# Patient Record
Sex: FEMALE | Race: WHITE
[De-identification: names, ages, dates, MRNs, and addresses within clinical notes are randomized per-mention and may not be internally consistent; named-entity substitution may affect disease eponyms.]

---

## 2020-11-11 ENCOUNTER — HOSPITAL ENCOUNTER (OUTPATIENT)
Dept: HOSPITAL 76 - LAB.WCP | Age: 35
Discharge: HOME | End: 2020-11-11
Attending: NURSE PRACTITIONER
Payer: COMMERCIAL

## 2020-11-11 DIAGNOSIS — Z00.00: Primary | ICD-10-CM

## 2020-11-11 LAB
ALBUMIN DIAFP-MCNC: 4.1 G/DL (ref 3.2–5.5)
ALBUMIN/GLOB SERPL: 1.2 {RATIO} (ref 1–2.2)
ALP SERPL-CCNC: 42 IU/L (ref 42–121)
ALT SERPL W P-5'-P-CCNC: 15 IU/L (ref 10–60)
ANION GAP SERPL CALCULATED.4IONS-SCNC: 8 MMOL/L (ref 6–13)
AST SERPL W P-5'-P-CCNC: 17 IU/L (ref 10–42)
BASOPHILS NFR BLD AUTO: 0 10^3/UL (ref 0–0.1)
BASOPHILS NFR BLD AUTO: 0.5 %
BILIRUB BLD-MCNC: 0.6 MG/DL (ref 0.2–1)
BUN SERPL-MCNC: 19 MG/DL (ref 6–20)
CALCIUM UR-MCNC: 9.4 MG/DL (ref 8.5–10.3)
CHLORIDE SERPL-SCNC: 104 MMOL/L (ref 101–111)
CHOLEST SERPL-MCNC: 190 MG/DL
CO2 SERPL-SCNC: 27 MMOL/L (ref 21–32)
CREAT SERPLBLD-SCNC: 0.6 MG/DL (ref 0.4–1)
EOSINOPHIL # BLD AUTO: 0.1 10^3/UL (ref 0–0.7)
EOSINOPHIL NFR BLD AUTO: 1.4 %
ERYTHROCYTE [DISTWIDTH] IN BLOOD BY AUTOMATED COUNT: 13.2 % (ref 12–15)
GLOBULIN SER-MCNC: 3.4 G/DL (ref 2.1–4.2)
GLUCOSE SERPL-MCNC: 88 MG/DL (ref 70–100)
HDLC SERPL-MCNC: 53 MG/DL
HDLC SERPL: 3.6 {RATIO} (ref ?–4.4)
HGB UR QL STRIP: 13.9 G/DL (ref 12–16)
LDLC SERPL CALC-MCNC: 119 MG/DL
LDLC/HDLC SERPL: 2.2 {RATIO} (ref ?–4.4)
LYMPHOCYTES # SPEC AUTO: 2 10^3/UL (ref 1.5–3.5)
LYMPHOCYTES NFR BLD AUTO: 34.8 %
MCH RBC QN AUTO: 26.6 PG (ref 27–31)
MCHC RBC AUTO-ENTMCNC: 30.9 G/DL (ref 32–36)
MCV RBC AUTO: 86 FL (ref 81–99)
MONOCYTES # BLD AUTO: 0.5 10^3/UL (ref 0–1)
MONOCYTES NFR BLD AUTO: 8.7 %
NEUTROPHILS # BLD AUTO: 3.1 10^3/UL (ref 1.5–6.6)
NEUTROPHILS # SNV AUTO: 5.8 X10^3/UL (ref 4.8–10.8)
NEUTROPHILS NFR BLD AUTO: 54.3 %
PDW BLD AUTO: 11.2 FL (ref 7.9–10.8)
PLATELET # BLD: 245 10^3/UL (ref 130–450)
PROT SPEC-MCNC: 7.5 G/DL (ref 6.7–8.2)
RBC MAR: 5.23 10^6/UL (ref 4.2–5.4)
SODIUM SERPLBLD-SCNC: 139 MMOL/L (ref 135–145)
VLDLC SERPL-SCNC: 18 MG/DL

## 2020-11-11 PROCEDURE — 80061 LIPID PANEL: CPT

## 2020-11-11 PROCEDURE — 80053 COMPREHEN METABOLIC PANEL: CPT

## 2020-11-11 PROCEDURE — 84443 ASSAY THYROID STIM HORMONE: CPT

## 2020-11-11 PROCEDURE — 83721 ASSAY OF BLOOD LIPOPROTEIN: CPT

## 2020-11-11 PROCEDURE — 36415 COLL VENOUS BLD VENIPUNCTURE: CPT

## 2020-11-11 PROCEDURE — 85025 COMPLETE CBC W/AUTO DIFF WBC: CPT

## 2021-02-09 ENCOUNTER — HOSPITAL ENCOUNTER (EMERGENCY)
Dept: HOSPITAL 76 - ED | Age: 36
Discharge: HOME | End: 2021-02-09
Payer: COMMERCIAL

## 2021-02-09 ENCOUNTER — HOSPITAL ENCOUNTER (OUTPATIENT)
Dept: HOSPITAL 76 - EMS | Age: 36
End: 2021-02-09
Payer: COMMERCIAL

## 2021-02-09 VITALS — SYSTOLIC BLOOD PRESSURE: 132 MMHG | DIASTOLIC BLOOD PRESSURE: 80 MMHG

## 2021-02-09 VITALS — SYSTOLIC BLOOD PRESSURE: 134 MMHG | DIASTOLIC BLOOD PRESSURE: 73 MMHG

## 2021-02-09 DIAGNOSIS — Z98.890: ICD-10-CM

## 2021-02-09 DIAGNOSIS — R50.9: Primary | ICD-10-CM

## 2021-02-09 DIAGNOSIS — R53.1: ICD-10-CM

## 2021-02-09 DIAGNOSIS — Z20.822: ICD-10-CM

## 2021-02-09 DIAGNOSIS — K59.00: Primary | ICD-10-CM

## 2021-02-09 DIAGNOSIS — R10.30: ICD-10-CM

## 2021-02-09 LAB
ALBUMIN DIAFP-MCNC: 3.2 G/DL (ref 3.2–5.5)
ALBUMIN/GLOB SERPL: 1 {RATIO} (ref 1–2.2)
ALP SERPL-CCNC: 50 IU/L (ref 42–121)
ALT SERPL W P-5'-P-CCNC: 48 IU/L (ref 10–60)
ANION GAP SERPL CALCULATED.4IONS-SCNC: 10 MMOL/L (ref 6–13)
ANION GAP SERPL CALCULATED.4IONS-SCNC: 13 MMOL/L (ref 6–13)
AST SERPL W P-5'-P-CCNC: 31 IU/L (ref 10–42)
B-HCG SERPL QL: NEGATIVE
BASOPHILS NFR BLD AUTO: 0 10^3/UL (ref 0–0.1)
BASOPHILS NFR BLD AUTO: 0 10^3/UL (ref 0–0.1)
BASOPHILS NFR BLD AUTO: 0.1 %
BASOPHILS NFR BLD AUTO: 0.3 %
BILIRUB BLD-MCNC: 0.5 MG/DL (ref 0.2–1)
BUN SERPL-MCNC: 11 MG/DL (ref 6–20)
BUN SERPL-MCNC: 13 MG/DL (ref 6–20)
C PNEUM DNA NPH QL NAA+NON-PROBE: NOT DETECTED
CALCIUM UR-MCNC: 8.3 MG/DL (ref 8.5–10.3)
CALCIUM UR-MCNC: 8.6 MG/DL (ref 8.5–10.3)
CHLORIDE SERPL-SCNC: 100 MMOL/L (ref 101–111)
CHLORIDE SERPL-SCNC: 105 MMOL/L (ref 101–111)
CLARITY UR REFRACT.AUTO: CLEAR
CO2 SERPL-SCNC: 22 MMOL/L (ref 21–32)
CO2 SERPL-SCNC: 23 MMOL/L (ref 21–32)
CREAT SERPLBLD-SCNC: 0.6 MG/DL (ref 0.4–1)
CREAT SERPLBLD-SCNC: 0.6 MG/DL (ref 0.4–1)
EOSINOPHIL # BLD AUTO: 0 10^3/UL (ref 0–0.7)
EOSINOPHIL # BLD AUTO: 0.1 10^3/UL (ref 0–0.7)
EOSINOPHIL NFR BLD AUTO: 0 %
EOSINOPHIL NFR BLD AUTO: 1.3 %
ERYTHROCYTE [DISTWIDTH] IN BLOOD BY AUTOMATED COUNT: 12.9 % (ref 12–15)
ERYTHROCYTE [DISTWIDTH] IN BLOOD BY AUTOMATED COUNT: 13.3 % (ref 12–15)
GLOBULIN SER-MCNC: 3.2 G/DL (ref 2.1–4.2)
GLUCOSE SERPL-MCNC: 111 MG/DL (ref 70–100)
GLUCOSE SERPL-MCNC: 125 MG/DL (ref 70–100)
GLUCOSE UR QL STRIP.AUTO: NEGATIVE MG/DL
HCG UR QL: NEGATIVE
HGB UR QL STRIP: 11.4 G/DL (ref 12–16)
HGB UR QL STRIP: 12 G/DL (ref 12–16)
KETONES UR QL STRIP.AUTO: 15 MG/DL
LYMPHOCYTES # SPEC AUTO: 1.1 10^3/UL (ref 1.5–3.5)
LYMPHOCYTES # SPEC AUTO: 1.1 10^3/UL (ref 1.5–3.5)
LYMPHOCYTES NFR BLD AUTO: 14.1 %
LYMPHOCYTES NFR BLD AUTO: 15.3 %
MCH RBC QN AUTO: 26.5 PG (ref 27–31)
MCH RBC QN AUTO: 26.6 PG (ref 27–31)
MCHC RBC AUTO-ENTMCNC: 30.8 G/DL (ref 32–36)
MCHC RBC AUTO-ENTMCNC: 31.5 G/DL (ref 32–36)
MCV RBC AUTO: 84.6 FL (ref 81–99)
MCV RBC AUTO: 86.1 FL (ref 81–99)
MONOCYTES # BLD AUTO: 0.3 10^3/UL (ref 0–1)
MONOCYTES # BLD AUTO: 0.4 10^3/UL (ref 0–1)
MONOCYTES NFR BLD AUTO: 3.6 %
MONOCYTES NFR BLD AUTO: 5.1 %
NEUTROPHILS # BLD AUTO: 5.9 10^3/UL (ref 1.5–6.6)
NEUTROPHILS # BLD AUTO: 6.2 10^3/UL (ref 1.5–6.6)
NEUTROPHILS # SNV AUTO: 7.3 X10^3/UL (ref 4.8–10.8)
NEUTROPHILS # SNV AUTO: 7.8 X10^3/UL (ref 4.8–10.8)
NEUTROPHILS NFR BLD AUTO: 79.1 %
NEUTROPHILS NFR BLD AUTO: 80.9 %
NITRITE UR QL STRIP.AUTO: NEGATIVE
PDW BLD AUTO: 10.3 FL (ref 7.9–10.8)
PDW BLD AUTO: 10.6 FL (ref 7.9–10.8)
PH UR STRIP.AUTO: 7.5 PH (ref 5–7.5)
PLATELET # BLD: 255 10^3/UL (ref 130–450)
PLATELET # BLD: 260 10^3/UL (ref 130–450)
PROT SPEC-MCNC: 6.4 G/DL (ref 6.7–8.2)
PROT UR STRIP.AUTO-MCNC: NEGATIVE MG/DL
RBC # UR STRIP.AUTO: NEGATIVE /UL
RBC MAR: 4.28 10^6/UL (ref 4.2–5.4)
RBC MAR: 4.52 10^6/UL (ref 4.2–5.4)
SP GR UR STRIP.AUTO: 1.01 (ref 1–1.03)
UROBILINOGEN UR QL STRIP.AUTO: (no result) E.U./DL
UROBILINOGEN UR STRIP.AUTO-MCNC: NEGATIVE MG/DL

## 2021-02-09 PROCEDURE — 87070 CULTURE OTHR SPECIMN AEROBIC: CPT

## 2021-02-09 PROCEDURE — 36415 COLL VENOUS BLD VENIPUNCTURE: CPT

## 2021-02-09 PROCEDURE — 84703 CHORIONIC GONADOTROPIN ASSAY: CPT

## 2021-02-09 PROCEDURE — 96375 TX/PRO/DX INJ NEW DRUG ADDON: CPT

## 2021-02-09 PROCEDURE — 96376 TX/PRO/DX INJ SAME DRUG ADON: CPT

## 2021-02-09 PROCEDURE — 87086 URINE CULTURE/COLONY COUNT: CPT

## 2021-02-09 PROCEDURE — 74177 CT ABD & PELVIS W/CONTRAST: CPT

## 2021-02-09 PROCEDURE — 81003 URINALYSIS AUTO W/O SCOPE: CPT

## 2021-02-09 PROCEDURE — 85025 COMPLETE CBC W/AUTO DIFF WBC: CPT

## 2021-02-09 PROCEDURE — 96374 THER/PROPH/DIAG INJ IV PUSH: CPT

## 2021-02-09 PROCEDURE — 99285 EMERGENCY DEPT VISIT HI MDM: CPT

## 2021-02-09 PROCEDURE — 80048 BASIC METABOLIC PNL TOTAL CA: CPT

## 2021-02-09 PROCEDURE — 81001 URINALYSIS AUTO W/SCOPE: CPT

## 2021-02-09 PROCEDURE — 87430 STREP A AG IA: CPT

## 2021-02-09 PROCEDURE — 99284 EMERGENCY DEPT VISIT MOD MDM: CPT

## 2021-02-09 PROCEDURE — 80053 COMPREHEN METABOLIC PANEL: CPT

## 2021-02-09 PROCEDURE — 71045 X-RAY EXAM CHEST 1 VIEW: CPT

## 2021-02-09 PROCEDURE — 81025 URINE PREGNANCY TEST: CPT

## 2021-02-09 PROCEDURE — 0202U NFCT DS 22 TRGT SARS-COV-2: CPT

## 2021-02-09 NOTE — ED PHYSICIAN DOCUMENTATION
ED Addendum





- Addendum


Addendum: 





02/09/21 09:32


35-year-old female s/p manipulation of her ankle surgically has developed 

narcotic induced constipation we required 2 enemas in the emergency department 

to relieve her she feels much improved after several bowel movements and wants 

to go home.  She is instructed to take MiraLAX on a regular basis for 1 to 2 

weeks to retrain her colon.

## 2021-02-09 NOTE — CT REPORT
PROCEDURE:  Abdomen/Pelvis W

 

INDICATIONS:  Mid and lower abdominal cramping pain. History of recent foot and ankle surgery.

 

CONTRAST:  IV CONTRAST: Optiray 320 ml: 100 PO CONTRAST: *NO PO CONTRAST 

 

TECHNIQUE:  

After the administration of intravenous contrast, 5 mm thick sections acquired from the diaphragms to
 the symphysis.  5 mm thick coronal and sagittal reformats were acquired.  For radiation dose reducti
on, the following was used:  automated exposure control, adjustment of mA and/or kV according to dominic
ent size.  

 

COMPARISON:  None.

 

FINDINGS:  

Image quality:  Excellent.  

 

ABDOMEN:  

Lung bases: There is mild dependent atelectasis in the lung bases. Heart size is normal. There are bi
lateral breast implants partially visualized. 

 

Solid organs:  Evaluation of the liver demonstrates no focal hepatic lesions. Gallbladder appears wit
hin normal limits without calcified gallstones. Biliary system is non dilated.  The spleen is normal 
in size. Pancreas enhances normally without peripancreatic fat stranding or fluid collections.  No ad
renal nodules.  Kidneys demonstrate no hydronephrosis. 

 

Peritoneum and bowel:  There is moderate stool distention within the distal descending and sigmoid co
don compatible with constipation or obstipation. More proximally, there is moderate fluid distention 
throughout the colon with air-fluid levels within the descending, transverse, and ascending colon. Th
ere is also mild fluid distention within the distal small bowel with scattered air-fluid levels. The 
findings are suggestive of an ileus. No definite focal transition point to suggest a bowel obstructio
n. No free fluid or air.  

 

Nodes and vessels:  No retroperitoneal or mesenteric adenopathy by size criteria.  Aorta and inferior
 vena cava are normal in size.  

 

Miscellaneous:  No ventral hernias.  

 

 

PELVIS:  

Genitourinary:  Bladder wall thickness is normal.  

 

Miscellaneous:  No inguinal hernias or adenopathy.  

 

Bones:  No suspicious bony lesions.  No vertebral body compression fractures.  

 

IMPRESSION:  

 

1. Moderate stool distention of the sigmoid colon with fluid distention demonstrated more proximally 
throughout the colon as well as in the distal small bowel. Findings likely represent an ileus without
 definite evidence of focal bowel obstruction.

 

Reviewed by: Jef Walden MD on 2/9/2021 8:42 AM PST

Approved by: Jef Walden MD on 2/9/2021 8:42 AM PST

 

 

Station ID:  [id number]

## 2021-02-09 NOTE — ED PHYSICIAN DOCUMENTATION
PD HPI ABD PAIN





- Stated complaint


Stated Complaint: ADB PX





- History obtained from


History obtained from: Patient





- History of Present Illness


Timing - onset: How many hours ago (several hours of marked cramping abd pains 

mid/lower abd.)


Timing - details: Gradual onset (He states she had left foot and ankle 

reconstructive surgery 4 days ago.  She had not had a bowel movement for a day 

or 2 prior to that.  She had had just small firm stools in the last day.  She is

having increased cramping and bloating after taking mag citrate. Had had pain 

meds post op.)


Quality: Cramping, Aching, Fullness/distended


Location: All over / everywhere, Periumbilical


Improved by: No: Position


Worsened by: Moving.  No: Breathing


Associated symptoms: Nausea, Constipation.  No: Fever, Vomiting, Diarrhea, 

Melena


Similar symptoms before: Has not had sx before (history of constipation 

problems, not had cramps like this in the past. No prior abd surgery.)


Recently seen: Surgery (4 days ago for left foot and ankle.)





Review of Systems


Constitutional: denies: Fever, Chills


Nose: denies: Rhinorrhea / runny nose, Congestion


Throat: denies: Sore throat


Cardiac: denies: Chest pain / pressure


Respiratory: denies: Dyspnea, Cough


GI: reports: Abdominal Pain (cramping), Nausea, Constipation.  denies: Vomiting,

Diarrhea, Bloody / black stool


: denies: Dysuria, Unable to Void


Skin: denies: Rash


Neurologic: denies: Generalized weakness, Near syncope





PD PAST MEDICAL HISTORY





- Past Medical History


Cardiovascular: None


Respiratory: None


GI: None, Chronic constipation





- Present Medications


Home Medications: 


                                Ambulatory Orders











 Medication  Instructions  Recorded  Confirmed


 


Citalopram Hydrobromide 40 mg PO 02/09/21 





[Citalopram HBr]   


 


Tofacitinib Citrate [Xeljanz Xr] 11 mg PO 02/09/21 02/09/21


 


Topiramate [Topamax] 25 mg PO 02/09/21 


 


Zolmitriptan [Zomig]  02/09/21 














- Allergies


Allergies/Adverse Reactions: 


                                    Allergies











Allergy/AdvReac Type Severity Reaction Status Date / Time


 


No Known Drug Allergies Allergy   Verified 02/09/21 05:29














PD ED PE NORMAL





- Vitals


Vital signs reviewed: Yes





- General


General: Alert and oriented X 3, Well developed/nourished, Other (appears in 

considerable pain and rubbing her mid to lower abd repetitively. )





- Cardiac


Cardiac: RRR, No murmur





- Respiratory


Respiratory: Clear bilaterally





- Abdomen


Abdomen: Soft, Non distended, Other (increased bowel sounds mid abd. Tender mid 

abd without guarding nor percussion tender. )





- Female 


Female : Deferred





- Rectal


Rectal: Deferred





- Derm


Derm: Normal color, Warm and dry





- Extremities


Extremities: Other (dressing and splint on left ankle/foot. No calf tenderness 

nor swelling above the splint. )





- Neuro


Neuro: Alert and oriented X 3, No motor deficit, No sensory deficit, Normal 

speech





Results





- Vitals


Vitals: 


                               Vital Signs - 24 hr











  02/09/21 02/09/21 02/09/21





  03:52 04:17 06:00


 


Temperature 37.3 C  36.5 C


 


Heart Rate 108 H 94 85


 


Respiratory 22 27 H 21





Rate   


 


Blood Pressure 139/88 H 147/88 H 136/89 H


 


O2 Saturation 99 100 98








                                     Oxygen











O2 Source                      Room air

















- Labs


Labs: 


                                Laboratory Tests











  02/09/21 02/09/21 02/09/21





  06:07 06:07 06:07


 


WBC  7.3  


 


RBC  4.52  


 


Hgb  12.0  


 


Hct  38.9  


 


MCV  86.1  


 


MCH  26.5 L  


 


MCHC  30.8 L  


 


RDW  12.9  


 


Plt Count  260  


 


MPV  10.6  


 


Neut # (Auto)  5.9  


 


Lymph # (Auto)  1.1 L  


 


Mono # (Auto)  0.3  


 


Eos # (Auto)  0.0  


 


Baso # (Auto)  0.0  


 


Absolute Nucleated RBC  0.00  


 


Nucleated RBC %  0.0  


 


Sodium   137 


 


Potassium   3.7 


 


Chloride   105 


 


Carbon Dioxide   22 


 


Anion Gap   10.0 


 


BUN   13 


 


Creatinine   0.6 


 


Estimated GFR (MDRD)   114 


 


Glucose   111 H 


 


Calcium   8.3 L 


 


Serum HCG, Qual    NEGATIVE














PD MEDICAL DECISION MAKING





- ED course


Complexity details: reviewed results (CT scan is showing air-fluid levels which 

appears to be above an area of stool in the descending and sigmoid colon.  There

is some thickening of the bowel wall consistent with some potential 

inflammation.  Awaiting radiology report.), re-evaluated patient (Persistence of

abdominal cramping and pain, I opted for CT scan and labs to look for other 

possible causes.), considered differential (He is having a feeling of bloating. 

Increased abdominal sounds.  She does not have a surgically acute abdomen by 

exam but is having considerable pain.  Consider constipation versus obstipation 

or other process.), d/w patient





Departure





- Departure


Clinical Impression: 


 Status post surgical manipulation of ankle joint, Abdominal cramping, bilateral

lower quadrant





Constipation


Qualifiers:


 Constipation type: drug induced constipation Qualified Code(s): K59.03 - Drug 

induced constipation





Condition: Stable


Record reviewed to determine appropriate education?: Yes

## 2021-02-09 NOTE — XRAY REPORT
PROCEDURE:  Chest 1 View X-Ray

 

INDICATIONS:  fever

 

TECHNIQUE:  One view of the chest was acquired.  

 

COMPARISON:  None

 

FINDINGS:  

 

Surgical changes and devices:  None.  

 

Lungs and pleura:  No pleural effusions or pneumothorax.  Lungs are clear.  

 

Mediastinum:  Mediastinal contours appear normal.  Heart size is normal.  

 

Bones and chest wall:  No suspicious bony lesions.  Overlying soft tissues appear unremarkable.  

 

IMPRESSION:  

No acute cardiopulmonary pathology.

 

Reviewed by: Oneil Darden MD on 2/9/2021 5:11 PM PST

Approved by: Oneil Darden MD on 2/9/2021 5:11 PM PST

 

 

Station ID:  [id number]

## 2021-02-09 NOTE — ED PHYSICIAN DOCUMENTATION
History of Present Illness





- Stated complaint


Stated Complaint: CAN'T MOVE/FEVER





- Chief complaint


Chief Complaint: Fever





- History obtained from


History obtained from: Patient





- History of Present Illness


Timing: Today


Pain level max: 7


Pain level now: 6





- Additonal information


Additional information: 





Female who presents to the emergency department stating that she had left foot 

surgery 4 days ago.  Seen here this morning for constipation.  Felt better after

multiple enemas.  Today she went home, fell asleep and then woke up with a 

fever, body aches and sore throat.  Took Tylenol without relief.  Nothing makes 

it better or worse.  No cough.  Negative Covid screen last week. 





Review of Systems


Constitutional: reports: Fever (Tmax 37.9).  denies: Chills


Nose: denies: Rhinorrhea / runny nose, Congestion


Throat: reports: Sore throat


Cardiac: denies: Chest pain / pressure


Respiratory: denies: Cough


GI: denies: Vomiting, Diarrhea


Skin: denies: Rash


Musculoskeletal: denies: Neck pain, Back pain


Neurologic: denies: Headache





PD PAST MEDICAL HISTORY





- Past Medical History


Cardiovascular: None


Respiratory: None


GI: None, Chronic constipation





- Past Surgical History


Past Surgical History: Yes


Ortho: Other





- Present Medications


Home Medications: 


                                Ambulatory Orders











 Medication  Instructions  Recorded  Confirmed


 


Citalopram Hydrobromide 40 mg PO DAILY 02/09/21 02/09/21





[Citalopram HBr]   


 


Enoxaparin Sodium [Lovenox] 40 mg SQ DAILY 02/09/21 02/09/21


 


Tofacitinib Citrate [Xeljanz Xr] 11 mg PO DAILY 02/09/21 02/09/21


 


Topiramate [Topamax] 25 mg PO DAILY 02/09/21 02/09/21


 


Zolmitriptan [Zomig] 5 mg ORAL DAILY PRN 02/09/21 02/09/21














- Allergies


Allergies/Adverse Reactions: 


                                    Allergies











Allergy/AdvReac Type Severity Reaction Status Date / Time


 


No Known Drug Allergies Allergy   Verified 02/09/21 16:14














- Social History


Does the pt smoke?: No


Smoking Status: Never smoker


Does the pt drink ETOH?: Yes


Does the pt have substance abuse?: No





- Immunizations


Immunizations are current?: Yes





- POLST


Patient has POLST: No





PD ED PE NORMAL





- Vitals


Vital signs reviewed: Yes





- General


General: Alert and oriented X 3, No acute distress





- HEENT


HEENT: Moist mucous membranes





- Neck


Neck: Supple, no meningeal sign





- Cardiac


Cardiac: RRR





- Respiratory


Respiratory: No respiratory distress, Clear bilaterally





- Derm


Derm: Warm and dry





- Extremities


Extremities: No edema, No calf tenderness / cord, Other (L lower leg in splint)





- Neuro


Neuro: Alert and oriented X 3





Results





- Vitals


Vitals: 


                               Vital Signs - 24 hr











  02/09/21 02/09/21 02/09/21





  16:14 18:51 20:00


 


Temperature 37.9 C 37.9 C 


 


Heart Rate 132 H 110 H 107 H


 


Respiratory 20 15 21





Rate   


 


Blood Pressure 111/67 129/73 134/71 H


 


O2 Saturation 99 100 98














  02/09/21





  20:58


 


Temperature 37.9 C


 


Heart Rate 101 H


 


Respiratory 20





Rate 


 


Blood Pressure 134/73 H


 


O2 Saturation 99








                                     Oxygen











O2 Source                      Room air

















- Labs


Labs: 


                                Laboratory Tests











  02/09/21 02/09/21 02/09/21





  16:38 16:38 16:48


 


WBC  7.8  


 


RBC  4.28  


 


Hgb  11.4 L  


 


Hct  36.2 L  


 


MCV  84.6  


 


MCH  26.6 L  


 


MCHC  31.5 L  


 


RDW  13.3  


 


Plt Count  255  


 


MPV  10.3  


 


Neut # (Auto)  6.2  


 


Lymph # (Auto)  1.1 L  


 


Mono # (Auto)  0.4  


 


Eos # (Auto)  0.1  


 


Baso # (Auto)  0.0  


 


Absolute Nucleated RBC  0.00  


 


Nucleated RBC %  0.0  


 


Sodium   136 


 


Potassium   3.8 


 


Chloride   100 L 


 


Carbon Dioxide   23 


 


Anion Gap   13.0 


 


BUN   11 


 


Creatinine   0.6 


 


Estimated GFR (MDRD)   114 


 


Glucose   125 H 


 


Calcium   8.6 


 


Total Bilirubin   0.5 


 


AST   31 


 


ALT   48 


 


Alkaline Phosphatase   50 


 


Total Protein   6.4 L 


 


Albumin   3.2 


 


Globulin   3.2 


 


Albumin/Globulin Ratio   1.0 


 


Urine Color   


 


Urine Clarity   


 


Urine pH   


 


Ur Specific Gravity   


 


Urine Protein   


 


Urine Glucose (UA)   


 


Urine Ketones   


 


Urine Occult Blood   


 


Urine Nitrite   


 


Urine Bilirubin   


 


Urine Urobilinogen   


 


Ur Leukocyte Esterase   


 


Ur Microscopic Review   


 


Urine Culture Comments   


 


Urine HCG, Qual   


 


Nasal Adenovirus (PCR)   


 


Nasal B. parapertussis DNA (PCR)   


 


Nasal Coronavir 229E PCR   


 


Nasal Coronavir HKU1 PCR   


 


Nasal Coronavir NL63 PCR   


 


Nasal Coronavir OC43 PCR   


 


Nasal Enterovir/Rhinovir PCR   


 


Nasal Influenza B PCR   


 


Nasal Influenza A PCR   


 


Nasal Parainfluen 1 PCR   


 


Nasal Parainfluen 2 PCR   


 


Nasal Parainfluen 3 PCR   


 


Nasal Parainfluen 4 PCR   


 


Nasal RSV (PCR)   


 


Nasal B.pertussis DNA PCR   


 


Nasal C.pneumoniae (PCR)   


 


Reginaldo Human Metapneumo PCR   


 


Nasal M.pneumoniae (PCR)   


 


Nasal SARS-CoV-2 (PCR)   


 


Group A Strep Rapid    Negative














  02/09/21 02/09/21





  16:48 18:40


 


WBC  


 


RBC  


 


Hgb  


 


Hct  


 


MCV  


 


MCH  


 


MCHC  


 


RDW  


 


Plt Count  


 


MPV  


 


Neut # (Auto)  


 


Lymph # (Auto)  


 


Mono # (Auto)  


 


Eos # (Auto)  


 


Baso # (Auto)  


 


Absolute Nucleated RBC  


 


Nucleated RBC %  


 


Sodium  


 


Potassium  


 


Chloride  


 


Carbon Dioxide  


 


Anion Gap  


 


BUN  


 


Creatinine  


 


Estimated GFR (MDRD)  


 


Glucose  


 


Calcium  


 


Total Bilirubin  


 


AST  


 


ALT  


 


Alkaline Phosphatase  


 


Total Protein  


 


Albumin  


 


Globulin  


 


Albumin/Globulin Ratio  


 


Urine Color   YELLOW


 


Urine Clarity   CLEAR


 


Urine pH   7.5


 


Ur Specific Gravity   1.015


 


Urine Protein   NEGATIVE


 


Urine Glucose (UA)   NEGATIVE


 


Urine Ketones   15 H


 


Urine Occult Blood   NEGATIVE


 


Urine Nitrite   NEGATIVE


 


Urine Bilirubin   NEGATIVE


 


Urine Urobilinogen   0.2 (NORMAL)


 


Ur Leukocyte Esterase   NEGATIVE


 


Ur Microscopic Review   NOT INDICATED


 


Urine Culture Comments   NOT INDICATED


 


Urine HCG, Qual   NEGATIVE


 


Nasal Adenovirus (PCR)  NOT DETECTED 


 


Nasal B. parapertussis DNA (PCR)  NOT DETECTED 


 


Nasal Coronavir 229E PCR  NOT DETECTED 


 


Nasal Coronavir HKU1 PCR  NOT DETECTED 


 


Nasal Coronavir NL63 PCR  NOT DETECTED 


 


Nasal Coronavir OC43 PCR  NOT DETECTED 


 


Nasal Enterovir/Rhinovir PCR  NOT DETECTED 


 


Nasal Influenza B PCR  NOT DETECTED 


 


Nasal Influenza A PCR  NOT DETECTED 


 


Nasal Parainfluen 1 PCR  NOT DETECTED 


 


Nasal Parainfluen 2 PCR  NOT DETECTED 


 


Nasal Parainfluen 3 PCR  NOT DETECTED 


 


Nasal Parainfluen 4 PCR  NOT DETECTED 


 


Nasal RSV (PCR)  NOT DETECTED 


 


Nasal B.pertussis DNA PCR  NOT DETECTED 


 


Nasal C.pneumoniae (PCR)  NOT DETECTED 


 


Reginaldo Human Metapneumo PCR  NOT DETECTED 


 


Nasal M.pneumoniae (PCR)  NOT DETECTED 


 


Nasal SARS-CoV-2 (PCR)  NOT DETECTED 


 


Group A Strep Rapid  














- Rads (name of study)


  ** cxr


Radiology: Prelim report reviewed, EMP read contemporaneously, See rad report 

(no acute disease)





PD MEDICAL DECISION MAKING





- ED course


Complexity details: reviewed results, re-evaluated patient, considered 

differential, d/w patient


ED course: 





35-year-old female presents to the emergency department with weakness and 

borderline fever at home.  No significant laboratory abnormalities.  Feels 

better after Toradol and Decadron.  Given IV fluids as well.  Negative chest x-

ray.  Negative urinalysis.  Negative respiratory PCR.  Possible rheumatoid 

arthritis flare?  We will have her follow-up with her doctor for further care.  

She is very well-appearing, nontoxic.  No hypoxia.  No respiratory distress.  No

 indication for admission.  Patient counseled regarding signs and symptoms for 

which I believe and urgent re-evaluation would be necessary. Patient with good 

understanding of and agreement to plan and is comfortable going home at this 

time





This document was made in part using voice recognition software. While efforts 

are made to proofread this document, sound alike and grammatical errors may 

occur.





Departure





- Departure


Disposition: 01 Home, Self Care


Clinical Impression: 


 Weakness





Fever


Qualifiers:


 Fever type: unspecified Qualified Code(s): R50.9 - Fever, unspecified





Condition: Good


Instructions:  ED Fever Unconf Cause


Follow-Up: 


BANDAR NIELSEN, MSN, FNP [Primary Care Provider] - Within 3 Days


Comments: 


The cause of your symptoms is unclear today.  Your laboratory testing, 

urinalysis, chest x-ray, respiratory panel, including Covid, are all normal. 

Drink plenty of water at home.  Return if you worsen.  Hopefully the steroids 

will help with your swelling.  Follow-up with your doctor for further care.


Discharge Date/Time: 02/09/21 21:00

## 2021-08-18 ENCOUNTER — HOSPITAL ENCOUNTER (OUTPATIENT)
Dept: HOSPITAL 76 - LAB.WCP | Age: 36
Discharge: HOME | End: 2021-08-18
Attending: INTERNAL MEDICINE
Payer: COMMERCIAL

## 2021-08-18 DIAGNOSIS — M06.09: Primary | ICD-10-CM

## 2021-08-18 DIAGNOSIS — Z79.899: ICD-10-CM

## 2021-08-18 LAB
ALBUMIN DIAFP-MCNC: 3.7 G/DL (ref 3.2–5.5)
ALBUMIN/GLOB SERPL: 1.4 {RATIO} (ref 1–2.2)
ALP SERPL-CCNC: 52 IU/L (ref 42–121)
ALT SERPL W P-5'-P-CCNC: 11 IU/L (ref 10–60)
ANION GAP SERPL CALCULATED.4IONS-SCNC: 7 MMOL/L (ref 6–13)
AST SERPL W P-5'-P-CCNC: 16 IU/L (ref 10–42)
BASOPHILS NFR BLD AUTO: 0 10^3/UL (ref 0–0.1)
BASOPHILS NFR BLD AUTO: 0.4 %
BILIRUB BLD-MCNC: 0.8 MG/DL (ref 0.2–1)
BUN SERPL-MCNC: 12 MG/DL (ref 6–20)
CALCIUM UR-MCNC: 8.9 MG/DL (ref 8.5–10.3)
CHLORIDE SERPL-SCNC: 108 MMOL/L (ref 101–111)
CO2 SERPL-SCNC: 26 MMOL/L (ref 21–32)
CREAT SERPLBLD-SCNC: 0.6 MG/DL (ref 0.4–1)
EOSINOPHIL # BLD AUTO: 0.1 10^3/UL (ref 0–0.7)
EOSINOPHIL NFR BLD AUTO: 1.2 %
ERYTHROCYTE [DISTWIDTH] IN BLOOD BY AUTOMATED COUNT: 14.4 % (ref 12–15)
GFRSERPLBLD MDRD-ARVRAT: 114 ML/MIN/{1.73_M2} (ref 89–?)
GLOBULIN SER-MCNC: 2.6 G/DL (ref 2.1–4.2)
GLUCOSE SERPL-MCNC: 81 MG/DL (ref 70–100)
HCT VFR BLD AUTO: 44.8 % (ref 37–47)
HGB UR QL STRIP: 13.4 G/DL (ref 12–16)
LYMPHOCYTES # SPEC AUTO: 1.6 10^3/UL (ref 1.5–3.5)
LYMPHOCYTES NFR BLD AUTO: 32.1 %
MCH RBC QN AUTO: 25.9 PG (ref 27–31)
MCHC RBC AUTO-ENTMCNC: 29.9 G/DL (ref 32–36)
MCV RBC AUTO: 86.7 FL (ref 81–99)
MONOCYTES # BLD AUTO: 0.4 10^3/UL (ref 0–1)
MONOCYTES NFR BLD AUTO: 7.6 %
NEUTROPHILS # BLD AUTO: 2.9 10^3/UL (ref 1.5–6.6)
NEUTROPHILS # SNV AUTO: 5 X10^3/UL (ref 4.8–10.8)
NEUTROPHILS NFR BLD AUTO: 58.3 %
NRBC # BLD AUTO: 0 /100WBC
NRBC # BLD AUTO: 0 X10^3/UL
PDW BLD AUTO: 11.1 FL (ref 7.9–10.8)
PLATELET # BLD: 251 10^3/UL (ref 130–450)
POTASSIUM SERPL-SCNC: 4.3 MMOL/L (ref 3.5–5)
PROT SPEC-MCNC: 6.3 G/DL (ref 6.7–8.2)
RBC MAR: 5.17 10^6/UL (ref 4.2–5.4)
SODIUM SERPLBLD-SCNC: 141 MMOL/L (ref 135–145)

## 2021-08-18 PROCEDURE — 36415 COLL VENOUS BLD VENIPUNCTURE: CPT

## 2021-08-18 PROCEDURE — 80053 COMPREHEN METABOLIC PANEL: CPT

## 2021-08-18 PROCEDURE — 85025 COMPLETE CBC W/AUTO DIFF WBC: CPT

## 2021-12-13 ENCOUNTER — HOSPITAL ENCOUNTER (OUTPATIENT)
Dept: HOSPITAL 76 - LAB.N | Age: 36
Discharge: HOME | End: 2021-12-13
Attending: PHYSICIAN ASSISTANT
Payer: COMMERCIAL

## 2021-12-13 DIAGNOSIS — R05.9: Primary | ICD-10-CM

## 2021-12-13 DIAGNOSIS — Z20.822: ICD-10-CM

## 2021-12-29 ENCOUNTER — HOSPITAL ENCOUNTER (OUTPATIENT)
Dept: HOSPITAL 76 - DI.N | Age: 36
Discharge: HOME | End: 2021-12-29
Attending: FAMILY MEDICINE
Payer: COMMERCIAL

## 2021-12-29 DIAGNOSIS — Z20.822: ICD-10-CM

## 2021-12-29 DIAGNOSIS — R05.9: Primary | ICD-10-CM

## 2021-12-29 PROCEDURE — 87276 INFLUENZA A AG IF: CPT

## 2021-12-29 PROCEDURE — 87275 INFLUENZA B AG IF: CPT

## 2021-12-29 NOTE — XRAY REPORT
PROCEDURE:  Chest 2 View X-Ray

 

INDICATIONS:  COUGH

 

TECHNIQUE:  2 view(s) of the chest.  

 

COMPARISON:  2/9/2021

 

FINDINGS:  

 

Surgical changes and devices:  None.  

 

Lungs and pleura:  No pleural effusions or pneumothorax.  Lungs are clear.  

 

Mediastinum:  Mediastinal contours are normal.  Heart size is normal.  

 

Bones and chest wall:  No suspicious bony abnormalities.  Soft tissues appear unremarkable.  

 

IMPRESSION:  No acute cardiopulmonary process demonstrated radiographically.

 

Reviewed by: David Campo MD on 12/29/2021 6:04 PM PST

Approved by: David Campo MD on 12/29/2021 6:04 PM [facility]

 

 

Station ID:  [id number]

## 2022-04-12 ENCOUNTER — HOSPITAL ENCOUNTER (OUTPATIENT)
Dept: HOSPITAL 76 - DI | Age: 37
Discharge: HOME | End: 2022-04-12
Attending: FAMILY MEDICINE
Payer: COMMERCIAL

## 2022-04-12 DIAGNOSIS — Z82.0: ICD-10-CM

## 2022-04-12 DIAGNOSIS — G43.909: Primary | ICD-10-CM

## 2022-04-12 PROCEDURE — 70544 MR ANGIOGRAPHY HEAD W/O DYE: CPT

## 2022-04-12 PROCEDURE — 70553 MRI BRAIN STEM W/O & W/DYE: CPT

## 2022-04-12 NOTE — MRI REPORT
PROCEDURE:  Angio Brain W/O (MRA)

 

INDICATIONS:  MIGRAINES, FAM HIST OF BRAIN ANEURYSM

 

TECHNIQUE:  

Noncontrast axial 3-D time-of-flight MR angiogram, with 3-dimensional maximum intensity projection (M
IP) reformats of the internal carotid arteries and posterior circulation then performed.  

 

COMPARISON:  None.

 

FINDINGS:  

Image quality:  Excellent.  

 

Anterior circulation:  Intracranial internal carotid arteries demonstrate normal size and intralumina
l flow signal.  The flow within the paired anterior cerebral arteries is normal and symmetric.  The f
low within the middle cerebral arteries is normal and symmetric.  The anterior communicating artery i
s seen.  No stenoses, occlusions, or aneurysms.  

 

Posterior circulation:  Visualized portions of the vertebral arteries demonstrate normal caliber, and
 join to form a normal appearing basilar artery.  The flow within the posterior cerebral arteries is 
normal and symmetric.  No stenoses, occlusions, or aneurysms.  

 

IMPRESSION:  

Unremarkable MR angiogram of the brain. No evidence of significant stenosis, aneurysm or vascular mal
formation

 

Reviewed by: Christiano Garcia MD on 4/12/2022 12:54 PM [facility]

Approved by: Christiano Garcia MD on 4/12/2022 12:54 PM [facility]

 

 

Station ID:  [id number]

## 2022-04-12 NOTE — MRI REPORT
PROCEDURE:  MRI brain with and without contrast

 

INDICATIONS:  MIGRAINES, FAM HIST OF BRAIN ANEURYSM

 

CONTRAST:  IV CONTRAST: Gadavist ml: 7.5   

                       

TECHNIQUE:  

Noncontrast axial T1 spin echo, axial T2 fast spin echo, sagittal and axial FLAIR, coronal T2 fast sp
in echo, axial gradient echo, axial diffusion and ADC through the brain.  After the administration of
 contrast, axial and coronal T1 spin echo with fat saturation through the brain.  

 

COMPARISON:  None.

 

FINDINGS:  

Image quality:  Excellent.  

 

CSF spaces:  Basal cisterns are patent.  No extra-axial fluid collections.  Ventricles are normal in 
size and shape.  

 

Brain:  No midline shift.  No intracranial bleeds or masses.  No abnormal intracranial enhancement.  
There is cerebral volume loss for age.  There is periventricular white matter chronic small vessel is
chemic change.  The brainstem appears normal.  Diffusion-weighted images demonstrate no acute ischemi
c insults.  No chronic ischemic insults.  Normal intravascular flow voids are present.  

 

Skull and face:  Calvarial marrow is normal in signal.  Orbits appear normal.  

 

Sinuses:  Sinuses and mastoids appear clear.  

 

IMPRESSION:  

1. Unremarkable MRI of the brain with and without contrast

 

Reviewed by: Christiano Garcia MD on 4/12/2022 1:01 PM [facility]

Approved by: Christiano Garcia MD on 4/12/2022 1:01 PM [email or web address]

 

 

Station ID:  [id number]

## 2022-04-25 ENCOUNTER — HOSPITAL ENCOUNTER (OUTPATIENT)
Dept: HOSPITAL 76 - LAB.N | Age: 37
Discharge: HOME | End: 2022-04-25
Attending: INTERNAL MEDICINE
Payer: COMMERCIAL

## 2022-04-25 DIAGNOSIS — Z79.899: ICD-10-CM

## 2022-04-25 DIAGNOSIS — M06.09: Primary | ICD-10-CM

## 2022-04-25 LAB
ALBUMIN DIAFP-MCNC: 3.8 G/DL (ref 3.2–5.5)
ALBUMIN/GLOB SERPL: 1.4 {RATIO} (ref 1–2.2)
ALP SERPL-CCNC: 49 IU/L (ref 42–121)
ALT SERPL W P-5'-P-CCNC: 18 IU/L (ref 10–60)
ANION GAP SERPL CALCULATED.4IONS-SCNC: 7 MMOL/L (ref 6–13)
AST SERPL W P-5'-P-CCNC: 19 IU/L (ref 10–42)
BILIRUB BLD-MCNC: 0.5 MG/DL (ref 0.2–1)
BUN SERPL-MCNC: 14 MG/DL (ref 6–20)
CALCIUM UR-MCNC: 8.7 MG/DL (ref 8.5–10.3)
CHLORIDE SERPL-SCNC: 104 MMOL/L (ref 101–111)
CO2 SERPL-SCNC: 29 MMOL/L (ref 21–32)
CREAT SERPLBLD-SCNC: 0.8 MG/DL (ref 0.4–1)
GFRSERPLBLD MDRD-ARVRAT: 81 ML/MIN/{1.73_M2} (ref 89–?)
GLOBULIN SER-MCNC: 2.8 G/DL (ref 2.1–4.2)
GLUCOSE SERPL-MCNC: 96 MG/DL (ref 70–100)
POTASSIUM SERPL-SCNC: 4 MMOL/L (ref 3.5–5)
PROT SPEC-MCNC: 6.6 G/DL (ref 6.7–8.2)
SODIUM SERPLBLD-SCNC: 140 MMOL/L (ref 135–145)

## 2022-04-25 PROCEDURE — 80053 COMPREHEN METABOLIC PANEL: CPT

## 2022-04-25 PROCEDURE — 36415 COLL VENOUS BLD VENIPUNCTURE: CPT

## 2022-12-12 ENCOUNTER — HOSPITAL ENCOUNTER (OUTPATIENT)
Dept: HOSPITAL 76 - LAB.N | Age: 37
Discharge: HOME | End: 2022-12-12
Attending: INTERNAL MEDICINE
Payer: COMMERCIAL

## 2022-12-12 DIAGNOSIS — Z79.899: ICD-10-CM

## 2022-12-12 DIAGNOSIS — M06.09: Primary | ICD-10-CM

## 2022-12-12 LAB
ALBUMIN DIAFP-MCNC: 3.9 G/DL (ref 3.2–5.5)
ALBUMIN/GLOB SERPL: 1.3 {RATIO} (ref 1–2.2)
ALP SERPL-CCNC: 53 IU/L (ref 42–121)
ALT SERPL W P-5'-P-CCNC: 13 IU/L (ref 10–60)
ANION GAP SERPL CALCULATED.4IONS-SCNC: 7 MMOL/L (ref 6–13)
AST SERPL W P-5'-P-CCNC: 17 IU/L (ref 10–42)
BILIRUB BLD-MCNC: 0.4 MG/DL (ref 0.2–1)
BUN SERPL-MCNC: 12 MG/DL (ref 6–20)
CALCIUM UR-MCNC: 9 MG/DL (ref 8.5–10.3)
CHLORIDE SERPL-SCNC: 104 MMOL/L (ref 101–111)
CO2 SERPL-SCNC: 26 MMOL/L (ref 21–32)
CREAT SERPLBLD-SCNC: 0.6 MG/DL (ref 0.4–1)
GFRSERPLBLD MDRD-ARVRAT: 112 ML/MIN/{1.73_M2} (ref 89–?)
GLOBULIN SER-MCNC: 2.9 G/DL (ref 2.1–4.2)
GLUCOSE SERPL-MCNC: 117 MG/DL (ref 70–100)
POTASSIUM SERPL-SCNC: 3.8 MMOL/L (ref 3.5–5)
PROT SPEC-MCNC: 6.8 G/DL (ref 6.7–8.2)
SODIUM SERPLBLD-SCNC: 137 MMOL/L (ref 135–145)

## 2022-12-12 PROCEDURE — 36415 COLL VENOUS BLD VENIPUNCTURE: CPT

## 2022-12-12 PROCEDURE — 80053 COMPREHEN METABOLIC PANEL: CPT

## 2023-01-04 ENCOUNTER — HOSPITAL ENCOUNTER (OUTPATIENT)
Dept: HOSPITAL 76 - LAB.N | Age: 38
Discharge: HOME | End: 2023-01-04
Attending: INTERNAL MEDICINE
Payer: COMMERCIAL

## 2023-01-04 DIAGNOSIS — Z79.899: ICD-10-CM

## 2023-01-04 DIAGNOSIS — M06.09: Primary | ICD-10-CM

## 2023-01-04 LAB
BASOPHILS NFR BLD AUTO: 0 10^3/UL (ref 0–0.1)
BASOPHILS NFR BLD AUTO: 0.7 %
EOSINOPHIL # BLD AUTO: 0.1 10^3/UL (ref 0–0.7)
EOSINOPHIL NFR BLD AUTO: 1.9 %
ERYTHROCYTE [DISTWIDTH] IN BLOOD BY AUTOMATED COUNT: 12.9 % (ref 12–15)
HCT VFR BLD AUTO: 41 % (ref 37–47)
HGB UR QL STRIP: 12.2 G/DL (ref 12–16)
LYMPHOCYTES # SPEC AUTO: 2.8 10^3/UL (ref 1.5–3.5)
LYMPHOCYTES NFR BLD AUTO: 49.1 %
MCH RBC QN AUTO: 25.2 PG (ref 27–31)
MCHC RBC AUTO-ENTMCNC: 29.8 G/DL (ref 32–36)
MCV RBC AUTO: 84.5 FL (ref 81–99)
MONOCYTES # BLD AUTO: 0.4 10^3/UL (ref 0–1)
MONOCYTES NFR BLD AUTO: 6.6 %
NEUTROPHILS # BLD AUTO: 2.4 10^3/UL (ref 1.5–6.6)
NEUTROPHILS # SNV AUTO: 5.8 X10^3/UL (ref 4.8–10.8)
NEUTROPHILS NFR BLD AUTO: 41.4 %
NRBC # BLD AUTO: 0 /100WBC
NRBC # BLD AUTO: 0 X10^3/UL
PDW BLD AUTO: 11.3 FL (ref 7.9–10.8)
PLATELET # BLD: 289 10^3/UL (ref 130–450)
RBC MAR: 4.85 10^6/UL (ref 4.2–5.4)

## 2023-01-04 PROCEDURE — 36415 COLL VENOUS BLD VENIPUNCTURE: CPT

## 2023-01-04 PROCEDURE — 85025 COMPLETE CBC W/AUTO DIFF WBC: CPT
